# Patient Record
(demographics unavailable — no encounter records)

---

## 2025-02-13 NOTE — DISCUSSION/SUMMARY
[FreeTextEntry1] : 1.  Hyperlipidemia-even though ASCVD risk is less than 5% given presence of plaque in the internal carotid arteries as well as coronary arteries places this patient at moderate risk for acute cardiac event.  I discussed with the patient risk and benefits of starting statins, and the patient agrees to start rosuvastatin 10 mg daily that was prescribed to her previously.  2.  Coronary artery disease-appears to be nonobstructive based on cardiac CT findings, recommend baby aspirin daily.

## 2025-02-13 NOTE — HISTORY OF PRESENT ILLNESS
[FreeTextEntry1] : 65-year-old woman with history of hyperlipidemia who comes in for second opinion regarding need for statins.  A few months ago, she had an episode of losing vision in the right eye that was suspicious for amaurosis fugax for which she underwent a full evaluation to rule out retinal artery occlusion.  She was found to have elevated cholesterol without any evidence of retinal occlusion, with some mild carotid disease, and some coronary plaques on CAC CT. when the patient states that she has no chest pain, dyspnea, lightheadedness, dizziness, loss of consciousness, orthopnea, or PND.

## 2025-02-13 NOTE — CARDIOLOGY SUMMARY
[de-identified] : 8/27/2024 -normal echocardiogram, normal LV and RV size and function.  No evidence of PFO with color Doppler no significant valvular abnormalities. [de-identified] : 9/17/24-coronary calcium score is 29 (68th percentile for age and gender), LAD 24, left circumflex 5 [de-identified] : 9/9/2024 minimal fibrocalcific internal carotid plaque less than 50% stenosis bilaterally. [de-identified] : Lipid panel: Total cholesterol 270, HDL 85, , triglycerides 52.